# Patient Record
Sex: FEMALE | Race: OTHER
[De-identification: names, ages, dates, MRNs, and addresses within clinical notes are randomized per-mention and may not be internally consistent; named-entity substitution may affect disease eponyms.]

---

## 2023-07-25 ENCOUNTER — HOSPITAL ENCOUNTER (INPATIENT)
Dept: HOSPITAL 54 - ER | Age: 33
LOS: 2 days | Discharge: HOME | DRG: 389 | End: 2023-07-27
Attending: NURSE PRACTITIONER | Admitting: NURSE PRACTITIONER
Payer: COMMERCIAL

## 2023-07-25 VITALS — SYSTOLIC BLOOD PRESSURE: 125 MMHG | TEMPERATURE: 98.9 F | OXYGEN SATURATION: 94 % | DIASTOLIC BLOOD PRESSURE: 79 MMHG

## 2023-07-25 VITALS — HEIGHT: 61 IN | BODY MASS INDEX: 52.67 KG/M2 | WEIGHT: 279 LBS

## 2023-07-25 DIAGNOSIS — Z98.84: ICD-10-CM

## 2023-07-25 DIAGNOSIS — K56.600: Primary | ICD-10-CM

## 2023-07-25 DIAGNOSIS — Z71.3: ICD-10-CM

## 2023-07-25 DIAGNOSIS — K56.7: ICD-10-CM

## 2023-07-25 DIAGNOSIS — K43.9: ICD-10-CM

## 2023-07-25 DIAGNOSIS — E11.9: ICD-10-CM

## 2023-07-25 DIAGNOSIS — Z79.84: ICD-10-CM

## 2023-07-25 DIAGNOSIS — E66.01: ICD-10-CM

## 2023-07-25 LAB
ALBUMIN SERPL BCP-MCNC: 3.9 G/DL (ref 3.4–5)
ALP SERPL-CCNC: 74 U/L (ref 46–116)
ALT SERPL W P-5'-P-CCNC: 142 U/L (ref 12–78)
AST SERPL W P-5'-P-CCNC: 66 U/L (ref 15–37)
BASOPHILS # BLD AUTO: 0 K/UL (ref 0–0.2)
BASOPHILS NFR BLD AUTO: 0.4 % (ref 0–2)
BILIRUB DIRECT SERPL-MCNC: 0.1 MG/DL (ref 0–0.2)
BILIRUB SERPL-MCNC: 0.6 MG/DL (ref 0.2–1)
BILIRUB UR QL STRIP: (no result)
BUN SERPL-MCNC: 8 MG/DL (ref 7–18)
CALCIUM SERPL-MCNC: 9.7 MG/DL (ref 8.5–10.1)
CHLORIDE SERPL-SCNC: 104 MMOL/L (ref 98–107)
CO2 SERPL-SCNC: 24 MMOL/L (ref 21–32)
COLOR UR: YELLOW
CREAT SERPL-MCNC: 0.5 MG/DL (ref 0.6–1.3)
DEPRECATED SQUAMOUS URNS QL MICRO: (no result) /HPF
EOSINOPHIL NFR BLD AUTO: 0.1 % (ref 0–6)
GLUCOSE SERPL-MCNC: 189 MG/DL (ref 74–106)
GLUCOSE UR STRIP-MCNC: (no result) MG/DL
HCT VFR BLD AUTO: 39 % (ref 33–45)
HGB BLD-MCNC: 11.7 G/DL (ref 11.5–14.8)
LEUKOCYTE ESTERASE UR QL STRIP: NEGATIVE
LIPASE SERPL-CCNC: 153 U/L (ref 73–393)
LYMPHOCYTES NFR BLD AUTO: 0.8 K/UL (ref 0.8–4.8)
LYMPHOCYTES NFR BLD AUTO: 10.4 % (ref 20–44)
MCHC RBC AUTO-ENTMCNC: 30 G/DL (ref 31–36)
MCV RBC AUTO: 61 FL (ref 82–100)
MONOCYTES NFR BLD AUTO: 0.4 K/UL (ref 0.1–1.3)
MONOCYTES NFR BLD AUTO: 4.6 % (ref 2–12)
NEUTROPHILS # BLD AUTO: 6.5 K/UL (ref 1.8–8.9)
NEUTROPHILS NFR BLD AUTO: 84.5 % (ref 43–81)
NITRITE UR QL STRIP: NEGATIVE
PH UR STRIP: 6.5 [PH] (ref 5–8)
PLATELET # BLD AUTO: 344 K/UL (ref 150–450)
POTASSIUM SERPL-SCNC: 3.7 MMOL/L (ref 3.5–5.1)
PROT SERPL-MCNC: 8.5 G/DL (ref 6.4–8.2)
PROT UR QL STRIP: (no result) MG/DL
RBC # BLD AUTO: 6.38 MIL/UL (ref 4–5.2)
RBC #/AREA URNS HPF: (no result) /HPF (ref 0–2)
SODIUM SERPL-SCNC: 143 MMOL/L (ref 136–145)
UROBILINOGEN UR STRIP-MCNC: 1 EU/DL
WBC #/AREA URNS HPF: (no result) /HPF
WBC #/AREA URNS HPF: (no result) /HPF (ref 0–3)
WBC NRBC COR # BLD AUTO: 7.7 K/UL (ref 4.3–11)

## 2023-07-25 PROCEDURE — G0378 HOSPITAL OBSERVATION PER HR: HCPCS

## 2023-07-25 PROCEDURE — C9113 INJ PANTOPRAZOLE SODIUM, VIA: HCPCS

## 2023-07-25 PROCEDURE — A4223 INFUSION SUPPLIES W/O PUMP: HCPCS

## 2023-07-25 RX ADMIN — ENOXAPARIN SODIUM SCH MG: 40 INJECTION SUBCUTANEOUS at 23:49

## 2023-07-25 RX ADMIN — SODIUM CHLORIDE PRN MLS/HR: 9 INJECTION, SOLUTION INTRAVENOUS at 23:50

## 2023-07-25 NOTE — NUR
RN ADMITTING NOTES



PT ARRIVED TO UNIT AT 2300 VIA GURNEY ASSISTED BY ER STAFF, ABLE TO WALK TO BED, STEADY GAIT 
NOTED. A/O X4, ABLE TO MAKE NEEDS KNOWN, NO COMPLAINTS OF NAUSEA AT THIS TIME, PT STATES "IT 
IS WELL CONTROLLED FOR NOW". VITAL SIGNS WNL. WEIGHT: 279 LB. ON RA WITH NO SOB OR DISTRESS 
NOTED. ABDOMEN SOFT, C/O 5/10 PAIN/TENDERNESS ON LLQ. BOWEL SOUNDS HYPOACTIVE. LUNG SOUNDS 
CLEAR. SKIN INTACT. CIRCULATION WNL. IV ACCESS RAC #20G, PATENT, INTACT, FLUSHING WELL. 
BELONGINGS DOCUMENTED AND PLACED IN CHART. INFORMED OF NPO ORDER, VERBALIZED UNDERSTANDING. 
ORIENTED TO UNIT AND HOW TO USE BED AND CALL LIGHT. MADE COMFORTABLE. SAFETY PRECAUTIONS PUT 
IN PLACE: BED LOCKED AND IN LOWEST POSITION, SIDE RAILS UP X2, CALL LIGHT AND TRAY TABLE 
WITHIN REACH. WILL CONTINUE TO MONITOR AND ASSIST.

## 2023-07-26 VITALS — SYSTOLIC BLOOD PRESSURE: 117 MMHG | TEMPERATURE: 98.6 F | OXYGEN SATURATION: 94 % | DIASTOLIC BLOOD PRESSURE: 77 MMHG

## 2023-07-26 VITALS — SYSTOLIC BLOOD PRESSURE: 129 MMHG | DIASTOLIC BLOOD PRESSURE: 77 MMHG | TEMPERATURE: 99.1 F | OXYGEN SATURATION: 93 %

## 2023-07-26 VITALS — DIASTOLIC BLOOD PRESSURE: 81 MMHG | TEMPERATURE: 98.6 F | SYSTOLIC BLOOD PRESSURE: 129 MMHG | OXYGEN SATURATION: 94 %

## 2023-07-26 LAB
BASOPHILS # BLD AUTO: 0 K/UL (ref 0–0.2)
BASOPHILS NFR BLD AUTO: 0.6 % (ref 0–2)
BUN SERPL-MCNC: 10 MG/DL (ref 7–18)
CALCIUM SERPL-MCNC: 9.2 MG/DL (ref 8.5–10.1)
CHLORIDE SERPL-SCNC: 108 MMOL/L (ref 98–107)
CHOLEST SERPL-MCNC: 117 MG/DL (ref ?–200)
CO2 SERPL-SCNC: 25 MMOL/L (ref 21–32)
CREAT SERPL-MCNC: 0.6 MG/DL (ref 0.6–1.3)
EOSINOPHIL NFR BLD AUTO: 0.5 % (ref 0–6)
GLUCOSE SERPL-MCNC: 151 MG/DL (ref 74–106)
HCT VFR BLD AUTO: 37 % (ref 33–45)
HDLC SERPL-MCNC: 25 MG/DL (ref 40–60)
HGB BLD-MCNC: 11.2 G/DL (ref 11.5–14.8)
LDLC SERPL DIRECT ASSAY-MCNC: 66 MG/DL (ref 0–99)
LYMPHOCYTES NFR BLD AUTO: 1.8 K/UL (ref 0.8–4.8)
LYMPHOCYTES NFR BLD AUTO: 21.2 % (ref 20–44)
MAGNESIUM SERPL-MCNC: 2.2 MG/DL (ref 1.8–2.4)
MCHC RBC AUTO-ENTMCNC: 31 G/DL (ref 31–36)
MCV RBC AUTO: 61 FL (ref 82–100)
MONOCYTES NFR BLD AUTO: 0.6 K/UL (ref 0.1–1.3)
MONOCYTES NFR BLD AUTO: 7.4 % (ref 2–12)
NEUTROPHILS # BLD AUTO: 5.9 K/UL (ref 1.8–8.9)
NEUTROPHILS NFR BLD AUTO: 70.3 % (ref 43–81)
PHOSPHATE SERPL-MCNC: 4.1 MG/DL (ref 2.5–4.9)
PLATELET # BLD AUTO: 319 K/UL (ref 150–450)
POTASSIUM SERPL-SCNC: 3.6 MMOL/L (ref 3.5–5.1)
RBC # BLD AUTO: 6.04 MIL/UL (ref 4–5.2)
SODIUM SERPL-SCNC: 145 MMOL/L (ref 136–145)
TRIGL SERPL-MCNC: 201 MG/DL (ref 30–150)
TSH SERPL DL<=0.005 MIU/L-ACNC: 0.81 UIU/ML (ref 0.36–3.74)
WBC NRBC COR # BLD AUTO: 8.4 K/UL (ref 4.3–11)

## 2023-07-26 RX ADMIN — DEXTROSE MONOHYDRATE PRN MG: 50 INJECTION, SOLUTION INTRAVENOUS at 03:00

## 2023-07-26 RX ADMIN — Medication SCH EACH: at 12:36

## 2023-07-26 RX ADMIN — Medication SCH EACH: at 00:24

## 2023-07-26 RX ADMIN — INSULIN HUMAN PRN UNITS: 100 INJECTION, SOLUTION PARENTERAL at 05:51

## 2023-07-26 RX ADMIN — DEXTROSE MONOHYDRATE PRN MG: 50 INJECTION, SOLUTION INTRAVENOUS at 23:54

## 2023-07-26 RX ADMIN — Medication SCH EACH: at 23:37

## 2023-07-26 RX ADMIN — ENOXAPARIN SODIUM SCH MG: 40 INJECTION SUBCUTANEOUS at 23:19

## 2023-07-26 RX ADMIN — SODIUM CHLORIDE PRN MLS/HR: 9 INJECTION, SOLUTION INTRAVENOUS at 17:19

## 2023-07-26 RX ADMIN — Medication PRN MG: at 02:13

## 2023-07-26 RX ADMIN — Medication SCH EACH: at 05:51

## 2023-07-26 RX ADMIN — Medication SCH EACH: at 17:40

## 2023-07-26 RX ADMIN — INSULIN HUMAN PRN UNITS: 100 INJECTION, SOLUTION PARENTERAL at 00:24

## 2023-07-26 RX ADMIN — DEXTROSE MONOHYDRATE PRN MG: 50 INJECTION, SOLUTION INTRAVENOUS at 13:13

## 2023-07-26 RX ADMIN — Medication PRN MG: at 13:14

## 2023-07-26 RX ADMIN — SODIUM CHLORIDE SCH MG: 9 INJECTION, SOLUTION INTRAVENOUS at 08:22

## 2023-07-26 RX ADMIN — INSULIN HUMAN PRN UNITS: 100 INJECTION, SOLUTION PARENTERAL at 23:35

## 2023-07-26 NOTE — NUR
MS RN OPENING NOTES:



PATIENT AWAKE IN BED, A/O X4. INDEPENDENT WITH ADLS AND MOBILITY. ON ROOM AIR TOLERATING 
WELL. WITH IV PERIPHERAL LINE INTACT, WITH IV FLUIDS INFUSING. ON CLEAR LIQUIDS. DENIES 
NAUSEA AND VOMITING. NO COMPLAINTS OF ABDOMINAL PAIN AT THIS TIME. INSTRUCTED HOW TO USE 
CALL LIGHT DEVICE, VERBALIZED UNDERSTANDING. WILL CONTINUE TO MONITOR.

## 2023-07-26 NOTE — NUR
MS RN NOTE:



H/H 11.2/37, , NO S/S OF BLEEDING. LOVENOX INJECTION GIVEN. COSIGNED BY NIRU BATISTA.

## 2023-07-26 NOTE — NUR
MS RN OPENING NOTES



RECEIVED PT RESTING IN BED AT THIS TIME, EASILY AROUSED. A/O X4, ABLE TO MAKE NEEDS KNOWN, 
STATES HAVING GAS, NAUSEOUS WITH EPISODE OF EMESISX1 AT THIS TIME. STABLE ON RA WITH NO SOB 
OR DISTRESS NOTED. IV ACCESS RAC #20G, PATENT, INTACT, FLUSHING WELL, RUNNING NS @ 75 ML/HR. 
NPO ORDERS MAINTAINED. WILL ADMINISTER ALL SCHEDULED MEDS AS ORDERED. SAFETY PRECAUTIONS 
MAINTAINED: BED LOCKED AND IN LOWEST POSITION, SIDE RAILS UP X2, CALL LIGHT AND TRAY TABLE 
WITHIN REACH. WILL CONTINUE TO MONITOR/.

## 2023-07-26 NOTE — NUR
RN NOTE: PT BLOOD SUGAR 160. TRENDING DOWN PER PREVIOUS LEVEL . ON NPO DIAGNOSIS. 
INSULIN HELD FOR NOW.

## 2023-07-26 NOTE — NUR
RN NOTE: PT C/O 5/10 LLQ ABDOMINAL PAIN. OFFERED PAIN ORDERED MORPHINE 4MG, PT REFUSED DUE 
TO NAUSEA AND UNABLE TO GIVE NAUSEA MED AT THIS TIME. PT STATED THAT SHE IS ABLE TO TOLERATE 
IT FOR NOW AND ABLE TO SLEEP COMFORTABLY.



MORPHINE 4MG (2MG X2) RETURNED TO [facility], WITNESSED BY CHRISTIANO STEVENS.

## 2023-07-26 NOTE — NUR
RN NOTE: PT C/O NAUSEA. HAD NON-BILIOUS VOMIT X2, PROVIDED WITH MORE EMESIS BAGS.



ALSO C/O 8/10 ABDOMINAL PAIN. OFFERED PRESCRIBED MORPHINE 4MG AGAIN AND STATED THAT IT MAY 
MAKE HER MORE NAUSEATED, PT VERBALIZED UNDERSTANDING OF POSSIBLE SIDE EFFECTS AND AGREED TO 
TAKE PAIN MED.

## 2023-07-26 NOTE — NUR
MS RN OPENING NOTES



 PT RESTING IN BED AT THIS TIME, EASILY AROUSED. A/O X4, ABLE TO MAKE NEEDS KNOWN, STATES 
NAUSEA HAS IMPROVED, DENIES PAIN AT THIS TIME. STABLE ON RA WITH NO SOB OR DISTRESS NOTED. 
IV ACCESS RAC #20G, PATENT, INTACT, FLUSHING WELL, RUNNING NS @ 75 ML/HR. ON CLEAR LIQUID 
DIET. ALL SCHEDULED MEDS ADMINSITERED AS ORDERED. SAFETY PRECAUTIONS MAINTAINED: BED LOCKED 
AND IN LOWEST POSITION, SIDE RAILS UP X2, CALL LIGHT AND TRAY TABLE WITHIN REACH. WILL 
ENDORSE TO NEXT SHIFT.

## 2023-07-26 NOTE — NUR
RN CLOSING NOTES



PT SLEEPING IN BED AT THIS TIME, EASILY AROUSED. A/O X4, ABLE TO MAKE NEEDS KNOWN, STATES 
HAVING GAS BUT NO C/O NAUSEA OR PAIN AT THIS TIME. STABLE ON RA WITH NO SOB OR DISTRESS 
NOTED. IV ACCESS RAC #20G, PATENT, INTACT, FLUSHING WELL, RUNNING NS @ 75 ML/HR. NPO ORDERS 
MAINTAINED. ALL CARE PROVIDED AND MEDS TOLERATED WELL. SAFETY PRECAUTIONS MAINTAINED: BED 
LOCKED AND IN LOWEST POSITION, SIDE RAILS UP X2, CALL LIGHT AND TRAY TABLE WITHIN REACH. 
WILL ENDORSE ASHLEY TO DAY SHIFT NURSE.

## 2023-07-26 NOTE — NUR
RN NOTE: N/V CEASED AT THIS TIME, TOTAL X3 EMESIS. PT VERBALIZED FEELING A LITTLE BETTER AT 
THIS TIME WITH SOME LINGERING NAUSEA. OFFERED NAUSEA MED, ACCEPTED. TOLERATED MED WELL.

## 2023-07-27 VITALS — SYSTOLIC BLOOD PRESSURE: 127 MMHG | DIASTOLIC BLOOD PRESSURE: 81 MMHG | OXYGEN SATURATION: 97 % | TEMPERATURE: 98.9 F

## 2023-07-27 LAB
BASOPHILS # BLD AUTO: 0 K/UL (ref 0–0.2)
BASOPHILS NFR BLD AUTO: 0.7 % (ref 0–2)
BUN SERPL-MCNC: 14 MG/DL (ref 7–18)
CALCIUM SERPL-MCNC: 9.3 MG/DL (ref 8.5–10.1)
CHLORIDE SERPL-SCNC: 106 MMOL/L (ref 98–107)
CO2 SERPL-SCNC: 25 MMOL/L (ref 21–32)
CREAT SERPL-MCNC: 0.6 MG/DL (ref 0.6–1.3)
EOSINOPHIL NFR BLD AUTO: 0.5 % (ref 0–6)
GLUCOSE SERPL-MCNC: 167 MG/DL (ref 74–106)
HCT VFR BLD AUTO: 36 % (ref 33–45)
HGB BLD-MCNC: 10.9 G/DL (ref 11.5–14.8)
LYMPHOCYTES NFR BLD AUTO: 1.1 K/UL (ref 0.8–4.8)
LYMPHOCYTES NFR BLD AUTO: 15.4 % (ref 20–44)
LYMPHOCYTES NFR BLD MANUAL: 17 % (ref 16–48)
MAGNESIUM SERPL-MCNC: 2.2 MG/DL (ref 1.8–2.4)
MCHC RBC AUTO-ENTMCNC: 30 G/DL (ref 31–36)
MCV RBC AUTO: 61 FL (ref 82–100)
MONOCYTES NFR BLD AUTO: 0.6 K/UL (ref 0.1–1.3)
MONOCYTES NFR BLD AUTO: 8.3 % (ref 2–12)
MONOCYTES NFR BLD MANUAL: 7 % (ref 0–11)
NEUTROPHILS # BLD AUTO: 5.3 K/UL (ref 1.8–8.9)
NEUTROPHILS NFR BLD AUTO: 75.1 % (ref 43–81)
NEUTS SEG NFR BLD MANUAL: 76 % (ref 42–76)
PHOSPHATE SERPL-MCNC: 4.2 MG/DL (ref 2.5–4.9)
PLATELET # BLD AUTO: 306 K/UL (ref 150–450)
POTASSIUM SERPL-SCNC: 3.4 MMOL/L (ref 3.5–5.1)
RBC # BLD AUTO: 5.9 MIL/UL (ref 4–5.2)
SODIUM SERPL-SCNC: 141 MMOL/L (ref 136–145)
WBC NRBC COR # BLD AUTO: 7 K/UL (ref 4.3–11)

## 2023-07-27 RX ADMIN — DEXTROSE MONOHYDRATE PRN MG: 50 INJECTION, SOLUTION INTRAVENOUS at 05:33

## 2023-07-27 RX ADMIN — DEXTROSE MONOHYDRATE PRN MG: 50 INJECTION, SOLUTION INTRAVENOUS at 12:59

## 2023-07-27 RX ADMIN — Medication SCH EACH: at 06:33

## 2023-07-27 RX ADMIN — INSULIN HUMAN PRN UNITS: 100 INJECTION, SOLUTION PARENTERAL at 06:36

## 2023-07-27 RX ADMIN — Medication SCH EACH: at 12:00

## 2023-07-27 RX ADMIN — SODIUM CHLORIDE SCH MG: 9 INJECTION, SOLUTION INTRAVENOUS at 09:12

## 2023-07-27 NOTE — NUR
MS RN NOTES:



PATIENT WITH EPISODES OF VOMITING X2, EMESIS CLEAR. COMPLAINTS OF NAUSEA. GIVEN IV ZOFRAN. 
WILL REASSESS.

## 2023-07-27 NOTE — NUR
MS RN NOTES:



PATIENT WITH EPISODES OF VOMITING X1, EMESIS CLEAR. COMPLAINTS OF NAUSEA. GIVEN IV ZOFRAN. 
WILL REASSESS.

## 2023-07-27 NOTE — NUR
RN DISCHARGED NOTES



PT DISCHARGED HOME IN STABLE CONDITION. A/O X4. ABLE TO MAKE NEEDS KNOWN. ON ROOM AIR, 
TOLERATING WELL, NO SOB NOTED. NO SKIN ISSUES NOTED. ALL BELONGINGS ACCOUNTED FOR AND PT 
SIGNED BELONGINGS LIST. IV ACCESS ON LAC G#20 REMOVED WITH NO ACTIVE BLEEDING NOTED, DRY 
DRESSING APPLIED AT SITE. HEALTH TEACHINGS/DISCHARGE INSTRUCTIONS GIVEN TO PT, VERBALIZED 
UNDERSTANDING. NAME ARMBAND REMOVED. PT LEFT UNIT @ 1337   ACCOMPANIED BY DOMINGO KAMARA 
LOBBY, PATIENT WILL DRIVE HOME BY HERSELF. CHARGE NURSE AWARE OF D/C.

## 2023-07-27 NOTE — NUR
MS RN OPENING NOTES:



RECEIVED PATIENT AWAKE IN BED, A/O X4. INDEPENDENT WITH ADLS AND MOBILITY. ON ROOM AIR 
TOLERATING WELL. WITH IV PERIPHERAL LINE RACG#20 INTACT, PATENT, WITH IV FLUIDS INFUSING. ON 
CLEAR LIQUIDS. DENIES NAUSEA AND VOMITING. NO COMPLAINTS OF ABDOMINAL PAIN AT THIS TIME. 
SAFETY MEASURES IN PLACE, BED IN LOWEST LOCKED POSITION, TABLE AND CALL LIGHT WITHIN REACH. 
WILL CONTINUE TO MONITOR PATIENT.

## 2023-07-27 NOTE — NUR
MS RN CLOSING NOTES:



PATIENT AWAKE IN BED, A/O X4. INDEPENDENT WITH ADLS AND MOBILITY. ON ROOM AIR TOLERATING 
WELL. WITH IV PERIPHERAL LINE INTACT, WITH IV FLUIDS INFUSING. ON CLEAR LIQUIDS. N/V 
IMPROVED WITH IV ZOFRAN. DENIES ABDOMINAL PAIN AT THIS TIME. NO RESPIRATORY DISTRESS DURING 
THE NIGHT. PLAN FOR CONT IVF. AWAIT GI CONSULT. ENDORSED TO MINO MARRUFO.